# Patient Record
Sex: MALE | Race: WHITE
[De-identification: names, ages, dates, MRNs, and addresses within clinical notes are randomized per-mention and may not be internally consistent; named-entity substitution may affect disease eponyms.]

---

## 2018-02-13 ENCOUNTER — HOSPITAL ENCOUNTER (INPATIENT)
Dept: HOSPITAL 77 - KA.ED | Age: 68
LOS: 2 days | Discharge: HOME | DRG: 193 | End: 2018-02-15
Attending: NURSE PRACTITIONER | Admitting: PHYSICIAN ASSISTANT
Payer: MEDICARE

## 2018-02-13 DIAGNOSIS — N28.9: ICD-10-CM

## 2018-02-13 DIAGNOSIS — J09.X2: Primary | ICD-10-CM

## 2018-02-13 DIAGNOSIS — I25.10: ICD-10-CM

## 2018-02-13 DIAGNOSIS — Z95.1: ICD-10-CM

## 2018-02-13 DIAGNOSIS — I21.4: ICD-10-CM

## 2018-02-13 DIAGNOSIS — E78.2: ICD-10-CM

## 2018-02-13 DIAGNOSIS — J44.1: ICD-10-CM

## 2018-02-13 DIAGNOSIS — R09.02: ICD-10-CM

## 2018-02-13 DIAGNOSIS — E11.9: ICD-10-CM

## 2018-02-13 DIAGNOSIS — Z79.899: ICD-10-CM

## 2018-02-13 DIAGNOSIS — Z87.891: ICD-10-CM

## 2018-02-13 DIAGNOSIS — N17.9: ICD-10-CM

## 2018-02-13 DIAGNOSIS — E86.0: ICD-10-CM

## 2018-02-13 LAB
CHLORIDE SERPL-SCNC: 104 MMOL/L (ref 98–115)
SODIUM SERPL-SCNC: 143 MMOL/L (ref 136–145)

## 2018-02-13 PROCEDURE — 94640 AIRWAY INHALATION TREATMENT: CPT

## 2018-02-13 PROCEDURE — 99285 EMERGENCY DEPT VISIT HI MDM: CPT

## 2018-02-13 PROCEDURE — 87804 INFLUENZA ASSAY W/OPTIC: CPT

## 2018-02-13 PROCEDURE — 83880 ASSAY OF NATRIURETIC PEPTIDE: CPT

## 2018-02-13 PROCEDURE — 87040 BLOOD CULTURE FOR BACTERIA: CPT

## 2018-02-13 PROCEDURE — 99284 EMERGENCY DEPT VISIT MOD MDM: CPT

## 2018-02-13 PROCEDURE — 36415 COLL VENOUS BLD VENIPUNCTURE: CPT

## 2018-02-13 PROCEDURE — 80053 COMPREHEN METABOLIC PANEL: CPT

## 2018-02-13 PROCEDURE — 93005 ELECTROCARDIOGRAM TRACING: CPT

## 2018-02-13 PROCEDURE — 82553 CREATINE MB FRACTION: CPT

## 2018-02-13 PROCEDURE — 85025 COMPLETE CBC W/AUTO DIFF WBC: CPT

## 2018-02-13 PROCEDURE — 84484 ASSAY OF TROPONIN QUANT: CPT

## 2018-02-13 PROCEDURE — 71046 X-RAY EXAM CHEST 2 VIEWS: CPT

## 2018-02-13 PROCEDURE — 82550 ASSAY OF CK (CPK): CPT

## 2018-02-13 RX ADMIN — ASPIRIN SCH MG: 325 TABLET, DELAYED RELEASE ORAL at 19:11

## 2018-02-13 RX ADMIN — FLUTICASONE PROPIONATE AND SALMETEROL SCH INHALATION: 50; 250 POWDER RESPIRATORY (INHALATION) at 21:40

## 2018-02-13 NOTE — EDM.PDOC
ED HPI GENERAL MEDICAL PROBLEM





- General


Chief Complaint: Respiratory Problem


Stated Complaint: CHEST PAIN,SOB


Time Seen by Provider: 02/13/18 16:45


Source of Information: Reports: Patient, Family


History Limitations: Reports: No Limitations





- History of Present Illness


INITIAL COMMENTS - FREE TEXT/NARRATIVE: 





68 YO WM presents to ER complaining of 3 day history of shortness of breath 

with productive cough and left sided chest pain. Pt reports history of CAD with 

CABG x 3 vessels 2010. Pt also with history of COPD and remote tobacco use (

quit in 2010). Pt states he has been using his advair BID but hasn't used his 

rescue inhaler. Pt states he feels better on oxygen. 


Onset Date: 02/11/18


Duration: Day(s): (3), Waxing/Waning


Location: Reports: Chest


Quality: Reports: Pressure


Severity: Mild


Associated Symptoms: Reports: Chest Pain, cough w sputum, Fever/Chills, Loss of 

Appetite, Shortness of Breath.  Denies: Nausea/Vomiting


Treatments PTA: Reports: NSAIDS





- Related Data


 Allergies











Allergy/AdvReac Type Severity Reaction Status Date / Time


 


No Known Drug Allergies Allergy  Other Verified 02/13/18 16:51











Home Meds: 


 Home Meds





Albuterol [Proventil HFA] 1 puff INH Q4H PRN 02/13/18 [History]


Aspirin [Adult Low Dose Aspirin EC] 81 mg PO BEDTIME 02/13/18 [History]


Calcium Carbonate/Vitamin D3 [Calcium 500 + Vit D Caplet] 1 tab PO BID 02/13/18 

[History]


Carvedilol [Carvedilol] 25 mg PO BIDMEALS 02/13/18 [History]


Cholecalciferol (Vitamin D3) [Vitamin D3] 2,000 unit PO DAILY 02/13/18 [History]


Clopidogrel [Plavix] 75 mg PO BEDTIME 02/13/18 [History]


Fenofibrate [Fenofibrate] 160 mg PO BEDTIME 02/13/18 [History]


Fluticasone/Salmeterol [Advair 250-50 Diskus] 1 puff INH BID 02/13/18 [History]


Furosemide 20 mg PO 1800 02/13/18 [History]


Furosemide 40 mg PO DAILY 02/13/18 [History]


Losartan [Cozaar] 100 mg PO DAILY 02/13/18 [History]


Simvastatin [Zocor] 20 mg PO BEDTIME 02/13/18 [History]


metFORMIN HCl [Metformin HCl ER] 1,000 mg PO BID 02/13/18 [History]











Past Medical History


HEENT History: Reports: Impaired Vision


Cardiovascular History: Reports: MI


Respiratory History: Reports: COPD


Genitourinary History: Reports: None


Endocrine/Metabolic History: Reports: Diabetes, Type II





- Past Surgical History


Head Surgeries/Procedures: Reports: None


HEENT Surgical History: Reports: Cataract Surgery


Cardiovascular Surgical History: Reports: Coronary Artery Bypass


Other Cardiovascular Surgeries/Procedures: 3 vessel bypass 2010


Respiratory Surgical History: Reports: None





Social & Family History





- Family History


Family Medical History: Noncontributory





- Tobacco Use


Smoking Status *Q: Former Smoker


Years of Tobacco use: 40


Used Tobacco, but Quit: Yes


Month Tobacco Last Used: 10


Second Hand Smoke Exposure: No





- Caffeine Use


Caffeine Use: Reports: Coffee





- Recreational Drug Use


Recreational Drug Use: No





ED ROS GENERAL





- Review of Systems


Review Of Systems: See Below


Constitutional: Reports: Fever, Chills


HEENT: Reports: No Symptoms


Respiratory: Reports: Shortness of Breath, Wheezing, Cough, Sputum


Cardiovascular: Reports: Chest Pain


Endocrine: Reports: No Symptoms


GI/Abdominal: Reports: No Symptoms


: Reports: No Symptoms


Musculoskeletal: Reports: No Symptoms


Skin: Reports: No Symptoms


Neurological: Reports: No Symptoms


Psychiatric: Reports: No Symptoms


Hematologic/Lymphatic: Reports: No Symptoms


Immunologic: Reports: No Symptoms





ED EXAM, GENERAL





- Physical Exam


Exam: See Below


Exam Limited By: No Limitations


General Appearance: Alert, WD/WN, No Apparent Distress


Nose: Normal Inspection, Normal Mucosa, No Blood


Throat/Mouth: Normal Inspection, Normal Lips, Normal Teeth, Normal Gums, Normal 

Oropharynx, Normal Voice, No Airway Compromise


Head: Atraumatic, Normocephalic


Neck: Normal Inspection, Supple, Non-Tender, Full Range of Motion


Respiratory/Chest: No Respiratory Distress, No Accessory Muscle Use, Chest Non-

Tender, Decreased Breath Sounds, Wheezing


Cardiovascular: Normal Peripheral Pulses, Regular Rate, Rhythm, No Edema, No 

Gallop, No JVD, No Murmur, No Rub, Tachycardia


GI/Abdominal: Normal Bowel Sounds, Soft, Non-Tender, No Organomegaly, No 

Distention, No Abnormal Bruit, No Mass


Back Exam: Normal Inspection, Full Range of Motion, NT


Extremities: Normal Inspection, Normal Range of Motion, Non-Tender, Normal 

Capillary Refill, No Pedal Edema


Neurological: Alert, Oriented, CN II-XII Intact, Normal Cognition, Normal Gait, 

Normal Reflexes, No Motor/Sensory Deficits


Psychiatric: Normal Affect, Normal Mood


Skin Exam: Warm, Dry, Intact, Normal Color, No Rash


Lymphatic: No Adenopathy





EKG INTERPRETATION


EKG Date: 02/13/18


Time: 16:41


Rhythm: NSR


Rate (Beats/Min): 119


Axis: Normal


P-Wave: Present


QRS: Normal


ST-T: Normal


QT: Normal


Comparison: NA - No Prior EKG





Course





- Vital Signs


Last Recorded V/S: 


 Last Vital Signs











Temp  38.2 C H  02/13/18 17:17


 


Pulse  117 H  02/13/18 17:11


 


Resp  24 H  02/13/18 16:37


 


BP  149/85 H  02/13/18 16:37


 


Pulse Ox  97   02/13/18 17:11














- Orders/Labs/Meds


Orders: 


 Active Orders 24 hr











 Category Date Time Status


 


 EKG Documentation Completion [RC] ASDIRECTED Care  02/13/18 16:32 Active


 


 RT Aerosol Therapy [RC] ASDIRECTED Care  02/13/18 17:11 Active


 


 Chest 2V [CR] Stat Exams  02/13/18 16:32 Taken


 


 CULTURE BLOOD [BC] Stat Lab  02/13/18 17:13 Ordered


 


 CULTURE BLOOD [BC] Stat Lab  02/13/18 17:13 Ordered


 


 INFLUENZA A+B AG SCREEN [RM] Stat Lab  02/13/18 16:35 Received


 


 Blood Culture x2 Reflex Set [OM.PC] Stat Oth  02/13/18 17:12 Ordered


 


 EKG 12 Lead [EK] Routine Ther  02/13/18 16:32 Ordered











Labs: 


 Laboratory Tests











  02/13/18 02/13/18 Range/Units





  16:35 16:35 


 


WBC  6.6   (5.0-10.0)  10^3/uL


 


RBC  3.86 L   (4.50-6.00)  10^6/uL


 


Hgb  11.9 L   (13.0-17.0)  g/dL


 


Hct  35.4 L   (40.0-52.0)  %


 


MCV  91.6   (82.0-92.0)  fL


 


MCH  30.8   (27.0-31.0)  pg


 


MCHC  33.6   (32.0-36.0)  g/dL


 


RDW  13.5   (11.5-14.5)  %


 


Plt Count  207   (150-300)  10^3/uL


 


MPV  6.8 L   (7.4-10.4)  fL


 


Neut % (Auto)  80.1 H   (50.0-70.0)  %


 


Lymph % (Auto)  7.2 L   (20.0-40.0)  %


 


Mono % (Auto)  11.2 H   (2.0-8.0)  %


 


Eos % (Auto)  0.4 L   (1.0-3.0)  %


 


Baso % (Auto)  1.1 H   (0.0-1.0)  %


 


Neut # (Auto)  5.3   (2.5-7.0)  10^3/uL


 


Lymph # (Auto)  0.5 L   (1.0-4.0)  10^3/uL


 


Mono # (Auto)  0.7   (0.1-0.8)  10^3/uL


 


Eos # (Auto)  0.0 L   (0.1-0.3)  10^3/uL


 


Baso # (Auto)  0.1   (0.0-0.1)  10^3/uL


 


Sodium   143  (136-145)  mmol/L


 


Potassium   4.7  (3.3-5.3)  mmol/L


 


Chloride   104  ()  mmol/L


 


Carbon Dioxide   25.3  (21.0-32.0)  mmol/L


 


BUN   27 H  (6-25)  mg/dL


 


Creatinine   1.67 H  (0.51-1.17)  mg/dL


 


Est Cr Clr Drug Dosing   42.10  mL/min


 


Estimated GFR (MDRD)   41  mL/min


 


Glucose   203 H  ()  mg/dL


 


Calcium   9.1  (8.7-10.3)  mg/dL


 


Total Bilirubin   1.2 H  (0.2-1.0)  mg/dL


 


AST   39 H  (15-37)  U/L


 


ALT   53  (12-78)  U/L


 


Alkaline Phosphatase   58  ()  IU/L


 


Creatine Kinase   97  ()  U/L


 


CK-MB (CK-2)   < 0.50  (0.00-4.30)  ng/mL


 


Troponin I   0.10 H*  (0.00-0.070)  ng/mL


 


B-Natriuretic Peptide   349 H  (0-100)  pg/mL


 


Total Protein   8.2  (6.4-8.2)  g/dL


 


Albumin   4.17  (3.00-4.80)  g/dL











Meds: 


Medications














Discontinued Medications














Generic Name Dose Route Start Last Admin





  Trade Name Jewell  PRN Reason Stop Dose Admin


 


Acetaminophen  1,000 mg  02/13/18 17:13  02/13/18 17:17





  Tylenol Extra Strength  PO  02/13/18 17:14  1,000 mg





  ONETIME ONE   Administration


 


Albuterol/Ipratropium  3 ml  02/13/18 17:10  02/13/18 17:18





  Duoneb 3.0-0.5 Mg/3 Ml  NEB  02/13/18 17:11  3 ml





  ONETIME ONE   Administration














- Radiology Interpretation


Free Text/Narrative:: 





CXR- NAD





Departure





- Departure


Time of Disposition: 18:06


Disposition: Admitted As Inpatient 67


Condition: Fair


Clinical Impression: 


 Influenza A, Hypoxia, COPD exacerbation, Elevated troponin I level, Renal 

insufficiency, mild








- Discharge Information


Referrals: 


Gail Trinidad, NP [Primary Care Provider] - 


Forms:  ED Department Discharge





- My Orders


Last 24 Hours: 


My Active Orders





02/13/18 16:32


EKG Documentation Completion [RC] ASDIRECTED 


Chest 2V [CR] Stat 


EKG 12 Lead [EK] Routine 





02/13/18 16:35


INFLUENZA A+B AG SCREEN [RM] Stat 





02/13/18 17:11


RT Aerosol Therapy [RC] ASDIRECTED 





02/13/18 17:12


Blood Culture x2 Reflex Set [OM.PC] Stat 





02/13/18 17:13


CULTURE BLOOD [BC] Stat 


CULTURE BLOOD [BC] Stat 














- Assessment/Plan


Last 24 Hours: 


My Active Orders





02/13/18 16:32


EKG Documentation Completion [RC] ASDIRECTED 


Chest 2V [CR] Stat 


EKG 12 Lead [EK] Routine 





02/13/18 16:35


INFLUENZA A+B AG SCREEN [RM] Stat 





02/13/18 17:11


RT Aerosol Therapy [RC] ASDIRECTED 





02/13/18 17:12


Blood Culture x2 Reflex Set [OM.PC] Stat 





02/13/18 17:13


CULTURE BLOOD [BC] Stat 


CULTURE BLOOD [BC] Stat 











Assessment:: 





1. COPD exacerbation


2. Influenza A 


3. elevated trop I


4. Hypoxia


5. Renal insufficiency





Plan: 





1. Admit for hypoxia- Faizan Mariusz


2. oxygen


3. tamiflu


4. supportive care


5. repeat trop I/labs in am


6. duoneb tx Q4

## 2018-02-14 LAB
CHLORIDE SERPL-SCNC: 104 MMOL/L (ref 98–115)
SODIUM SERPL-SCNC: 141 MMOL/L (ref 136–145)

## 2018-02-14 RX ADMIN — Medication PRN LOZ: at 20:37

## 2018-02-14 RX ADMIN — FLUTICASONE PROPIONATE AND SALMETEROL SCH INHALATION: 50; 250 POWDER RESPIRATORY (INHALATION) at 07:25

## 2018-02-14 RX ADMIN — FLUTICASONE PROPIONATE AND SALMETEROL SCH INHALATION: 50; 250 POWDER RESPIRATORY (INHALATION) at 20:29

## 2018-02-14 RX ADMIN — Medication SCH TAB: at 17:33

## 2018-02-14 RX ADMIN — ASPIRIN SCH MG: 325 TABLET, DELAYED RELEASE ORAL at 08:02

## 2018-02-14 RX ADMIN — Medication PRN LOZ: at 18:17

## 2018-02-14 RX ADMIN — VITAMIN D, TAB 1000IU (100/BT) SCH UNITS: 25 TAB at 08:02

## 2018-02-14 RX ADMIN — Medication SCH TAB: at 07:32

## 2018-02-14 NOTE — PCM.HP
H&P History of Present Illness





- General


Date of Service: 02/14/18


Admit Problem/Dx: 


 Admission Diagnosis/Problem





Admission Diagnosis/Problem      Hypoxia








Source of Information: Patient, Old Records, RN


History Limitations: Reports: No Limitations





- History of Present Illness


Initial Comments - Free Text/Narative: 





This is a 67 year old male who presented to the ED with complaints of 3 days of 

shortness of breath with a productive cough and left-sided chest pain. The 

patient was worked up in the ED and found to be positive for influenza A, COPD 

exacerbation, and rule out MI.  The patient's past medical history is 

significant for COPD for which he uses Advair. He also has a history of a CABG 

x 3 in 2010. He quit smoking in 2010. He lives at home with his wife. He did 

not receive an influenza vaccine this year, but has received a pneumonia 

vaccine. 





- Related Data


Allergies/Adverse Reactions: 


 Allergies











Allergy/AdvReac Type Severity Reaction Status Date / Time


 


No Known Drug Allergies Allergy  Other Verified 02/13/18 16:51











Home Medications: 


 Home Meds





Albuterol [Proventil HFA] 1 puff INH Q4H PRN 02/13/18 [History]


Aspirin [Adult Low Dose Aspirin EC] 81 mg PO BEDTIME 02/13/18 [History]


Calcium Carbonate/Vitamin D3 [Calcium 500 + Vit D Caplet] 1 tab PO BID 02/13/18 

[History]


Carvedilol [Carvedilol] 25 mg PO BIDMEALS 02/13/18 [History]


Cholecalciferol (Vitamin D3) [Vitamin D3] 2,000 unit PO DAILY 02/13/18 [History]


Clopidogrel [Plavix] 75 mg PO BEDTIME 02/13/18 [History]


Fenofibrate [Fenofibrate] 160 mg PO BEDTIME 02/13/18 [History]


Fluticasone/Salmeterol [Advair 250-50 Diskus] 1 puff INH BID 02/13/18 [History]


Furosemide 20 mg PO 1800 02/13/18 [History]


Furosemide 40 mg PO DAILY 02/13/18 [History]


Losartan [Cozaar] 100 mg PO DAILY 02/13/18 [History]


Simvastatin [Zocor] 20 mg PO BEDTIME 02/13/18 [History]


metFORMIN HCl [Metformin HCl ER] 1,000 mg PO BID 02/13/18 [History]











Past Medical History


HEENT History: Reports: Impaired Vision


Cardiovascular History: Reports: MI


Respiratory History: Reports: COPD


Genitourinary History: Reports: None


Endocrine/Metabolic History: Reports: Diabetes, Type II





- Infectious Disease History


Infectious Disease History: Reports: Influenza





- Past Surgical History


Head Surgeries/Procedures: Reports: None


HEENT Surgical History: Reports: Cataract Surgery


Cardiovascular Surgical History: Reports: Coronary Artery Bypass


Other Cardiovascular Surgeries/Procedures: 3 vessel bypass 2010


Respiratory Surgical History: Reports: None





Social & Family History





- Family History


Family Medical History: Noncontributory





- Tobacco Use


Smoking Status *Q: Former Smoker


Years of Tobacco use: 40


Used Tobacco, but Quit: Yes


Month Tobacco Last Used: 10


Second Hand Smoke Exposure: No





- Caffeine Use


Caffeine Use: Reports: Soda





- Recreational Drug Use


Recreational Drug Use: No





H&P Review of Systems





- Review of Systems:


Review Of Systems: See Below


General: Reports: Chills, Weakness, Fatigue, Night Sweats, Decreased Appetite.  

Denies: Fever


HEENT: Reports: Ear Pain (left ear), Glasses, Sinus Congestion.  Denies: 

Headaches, Sore Throat


Pulmonary: Reports: Cough, Sputum (dark brown).  Denies: Shortness of Breath, 

Wheezing


Cardiovascular: Denies: Chest Pain, Edema


Gastrointestinal: Denies: Abdominal Pain, Constipation, Diarrhea, Nausea


Genitourinary: Reports: No Symptoms


Neurological: Denies: Headache





Exam





- Exam


Exam: See Below





- Vital Signs


Vital Signs: 


 Last Vital Signs











Temp  98 F   02/14/18 06:29


 


Pulse  77   02/14/18 07:35


 


Resp  18   02/14/18 06:29


 


BP  110/67   02/14/18 08:01


 


Pulse Ox  97   02/14/18 07:25











Weight: 166 lb 6.4 oz





- Exam


Quality Assessment: Supplemental Oxygen (2 liters per nasal cannula saturation 

of 97%, does not typically wear oxygen), DVT Prophylaxis (Score of 2, will 

apply Silver stockings. Patient up and moving. )


General: Alert, Oriented, Cooperative, Other (No distress)


HEENT: Conjunctiva Clear, Hearing Intact, Mucosa Moist & Pink, Posterior 

Pharynx Clear, TMs Clear, Glasses


Neck: Supple, Trachea Midline.  No: Lymphadenopathy


Lungs: Normal Respiratory Effort, Decreased Breath Sounds (throughout).  No: 

Crackles, Rales, Wheezing


Cardiovascular: Regular Rate, Regular Rhythm, Normal S1, Normal S2


GI/Abdominal Exam: Normal Bowel Sounds, Soft, Non-Tender


Extremities: No Pedal Edema


Skin: Warm, Dry


Neurological: Normal Speech


Neuro Extensive - Mental Status: Alert, Oriented x3, Normal Mood/Affect


Psychiatric: Alert, Normal Affect, Normal Mood





- Patient Data


Lab Results Last 24 hrs: 


 Laboratory Results - last 24 hr











  02/14/18 02/14/18 02/14/18 Range/Units





  07:20 07:20 07:31 


 


WBC  4.5 L    (5.0-10.0)  10^3/uL


 


RBC  3.29 L    (4.50-6.00)  10^6/uL


 


Hgb  10.3 L D    (13.0-17.0)  g/dL


 


Hct  30.5 L    (40.0-52.0)  %


 


MCV  92.7 H    (82.0-92.0)  fL


 


MCH  31.2 H    (27.0-31.0)  pg


 


MCHC  33.6    (32.0-36.0)  g/dL


 


RDW  13.4    (11.5-14.5)  %


 


Plt Count  189    (150-300)  10^3/uL


 


MPV  6.7 L    (7.4-10.4)  fL


 


Neut % (Auto)  84.9 H    (50.0-70.0)  %


 


Lymph % (Auto)  7.4 L    (20.0-40.0)  %


 


Mono % (Auto)  7.2    (2.0-8.0)  %


 


Eos % (Auto)  0.2 L    (1.0-3.0)  %


 


Baso % (Auto)  0.3    (0.0-1.0)  %


 


Neut # (Auto)  3.9    (2.5-7.0)  10^3/uL


 


Lymph # (Auto)  0.3 L    (1.0-4.0)  10^3/uL


 


Mono # (Auto)  0.3    (0.1-0.8)  10^3/uL


 


Eos # (Auto)  0.0 L    (0.1-0.3)  10^3/uL


 


Baso # (Auto)  0.0    (0.0-0.1)  10^3/uL


 


Sodium   141   (136-145)  mmol/L


 


Potassium   4.5   (3.3-5.3)  mmol/L


 


Chloride   104   ()  mmol/L


 


Carbon Dioxide   25.8   (21.0-32.0)  mmol/L


 


BUN   33 H   (6-25)  mg/dL


 


Creatinine   1.40 H   (0.51-1.17)  mg/dL


 


Est Cr Clr Drug Dosing   50.21   mL/min


 


Estimated GFR (MDRD)   51   mL/min


 


Glucose   238 H   ()  mg/dL


 


POC Glucose    215 H  ()  mg/dl


 


Calcium   8.8   (8.7-10.3)  mg/dL


 


Troponin I   0.06   (0.00-0.070)  ng/mL











Result Diagrams: 


 02/14/18 07:20





 02/14/18 07:20





EKG INTERPRETATION


EKG Date: 02/13/18


Time: 16:41


Rhythm: Other (Sinus tachycardia with PACs)


Rate (Beats/Min): 119


Axis: Normal


P-Wave: Present


QRS: Normal


ST-T: Other (T wave abnormality in inferior leads)


QT: Normal


Comparison: Change From Previous EKG (new PACs versus old PVCs)





*Q Meaningful Use (ADM)





- VTE *Q


VTE Criteria *Q: 








- Stroke *Q


Stroke Criteria *Q: 








- AMI *Q


AMI Criteria *Q: 





Problem List Initiated/Reviewed/Updated: Yes


Orders Last 24hrs: 


 Active Orders 24 hr











 Category Date Time Status


 


 Patient Status [ADT] Routine ADT  02/13/18 17:59 Ordered


 


 Blood Glucose Check, Bedside [RC] TIDMEALS Care  02/13/18 17:58 Active


 


 Oxygen Therapy [RC] 0100,0900,1700 Care  02/13/18 17:59 Active


 


 Peripheral IV Care [RC] .AS DIRECTED Care  02/13/18 18:02 Active


 


 Pulse Oximetry [RC] .PRN Care  02/13/18 18:00 Active


 


 RT Aerosol Therapy [RC] .PRN Care  02/13/18 18:02 Active


 


 Up ad Vivian [RC] ASDIRECTED Care  02/14/18 09:17 Active


 


 Vital Signs [RC] 0700,1500,2300 Care  02/14/18 09:17 Active


 


 [employer] Diet [DIET] Diet  02/13/18 Dinner Active


 


 BMP [BASIC METABOLIC PANEL,BMP] [CHEM] AM Lab  02/15/18 05:11 Ordered


 


 CBC WITH AUTO DIFF [HEME] AM Lab  02/15/18 05:11 Ordered


 


 CULTURE SPUTUM + SMEAR [RM] Stat Lab  02/13/18 20:05 Ordered


 


 Acetaminophen [Tylenol] Med  02/13/18 17:58 Active





 650 mg PO Q4H PRN   


 


 Albuterol/Ipratropium [DuoNeb 3.0-0.5 MG/3 ML] Med  02/14/18 11:00 Active





 3 ml NEB Q6HRRT   


 


 Aspirin [Halfprin] Med  02/14/18 21:00 Active





 81 mg PO BEDTIME   


 


 Atropine [Atropine 0.1 MG/ML] Med  02/13/18 20:19 Active





 0 mg IVPUSH ASDIRECTED PRN   


 


 Calcium Citrate/Vitamin D3 [Calcium Citrate + D] Med  02/14/18 08:00 Active





 2 tab PO BIDMEALS   


 


 Carvedilol [Coreg] Med  02/14/18 08:00 Active





 25 mg PO BIDMEALS   


 


 Cholecalciferol (Vitamin D3) [Vitamin D3] Med  02/14/18 09:00 Active





 2,000 units PO DAILY   


 


 Clopidogrel [Plavix] Med  02/13/18 21:00 Active





 75 mg PO BEDTIME   


 


 EPINEPHrine [EPINEPHrine 1:10,000] Med  02/13/18 20:19 Active





 1 mg IVPUSH ASDIRECTED PRN   


 


 Fluticasone/Salmeterol [Advair Diskus 250-50] Med  02/13/18 21:00 Active





 0 puff INH BIDRT   


 


 Furosemide [Lasix] Med  02/14/18 18:00 Active





 20 mg PO 1800   


 


 Furosemide [Lasix] Med  02/14/18 09:00 Active





 40 mg PO DAILY   


 


 Insulin Aspart [NovoLOG] Med  02/14/18 08:00 Active





 See Protocol  SUBCUT TIDMEALS   


 


 Lidocaine 2% [Xylocaine 2%] Med  02/13/18 20:19 Active





 0 mg IVPUSH ASDIRECTED PRN   


 


 Losartan [Cozaar] Med  02/14/18 09:00 Active





 100 mg PO DAILY   


 


 Nitroglycerin [Nitro-Bid 2%] Med  02/13/18 18:04 Active





 1 gm TOP Q6H PRN   


 


 Nitroglycerin [Nitrostat] Med  02/13/18 20:19 Active





 0.4 mg SL ASDIRECTED PRN   


 


 Oseltamivir [Tamiflu] Med  02/13/18 18:15 Active





 75 mg PO BID   


 


 Patient's Own Medication [Ptom] Med  02/14/18 09:25 Active





 1 each PO BEDTIME   


 


 Simvastatin [Zocor] Med  02/13/18 21:00 Active





 20 mg PO BEDTIME   


 


 Sodium Chloride 0.9% [Normal Saline] 750 ml Med  02/14/18 09:19 Active





 IV .BOLUS   


 


 Sodium Chloride 0.9% [Syrex Flush] Med  02/13/18 17:58 Active





 5 ml FLUSH Q8HR PRN   


 


 Isolation [COMM] Routine Oth  02/13/18 18:03 Ordered


 


 Peripheral IV Insertion Adult [OM.PC] Routine Oth  02/13/18 17:58 Ordered


 


 Resuscitation Status Routine Resus Stat  02/13/18 17:58 Ordered








 Medication Orders





Acetaminophen (Tylenol)  650 mg PO Q4H PRN


   PRN Reason: Pain (Mild 1-3)/fever


Albuterol/Ipratropium (Duoneb 3.0-0.5 Mg/3 Ml)  3 ml NEB Q6HRRT [facility]


Aspirin (Halfprin)  81 mg PO BEDTIME DALJIT


Atropine Sulfate (Atropine 0.1 Mg/Ml)  0 mg IVPUSH ASDIRECTED PRN


   PRN Reason: Heart


Calcium Citrate (Calcium Citrate + D)  2 tab PO BIDMEALS [facility]


   Last Admin: 02/14/18 07:32  Dose: 2 tab


Carvedilol (Coreg)  25 mg PO BIDMEALS [facility]


   Last Admin: 02/14/18 07:35  Dose: 25 mg


Cholecalciferol (Vitamin D3)  2,000 units PO DAILY [facility]


   Last Admin: 02/14/18 08:02  Dose: 2,000 units


Clopidogrel Bisulfate (Plavix)  75 mg PO BEDTIME [facility]


   Last Admin: 02/13/18 21:40  Dose: 75 mg


Epinephrine HCl (Epinephrine 1:10,000)  1 mg IVPUSH ASDIRECTED PRN


   PRN Reason: Heart


Furosemide (Lasix)  20 mg PO 1800 DAJLIT


Furosemide (Lasix)  40 mg PO DAILY [facility]


   Last Admin: 02/14/18 08:02  Dose: 40 mg


Sodium Chloride (Normal Saline)  750 mls @ 750 mls/hr IV .BOLUS ONE


   Stop: 02/14/18 10:18


Insulin Aspart (Novolog)  0 unit SUBCUT TIDMEALS [facility]


   PRN Reason: Protocol


   Last Admin: 02/14/18 07:55  Dose: 2 units


Lidocaine HCl (Xylocaine 2%)  0 mg IVPUSH ASDIRECTED PRN


   PRN Reason: Heart


Losartan Potassium (Cozaar)  100 mg PO DAILY [facility]


   Last Admin: 02/14/18 08:01  Dose: 100 mg


Nitroglycerin (Nitro-Bid 2%)  1 gm TOP Q6H PRN


   PRN Reason: Chest Pain


Nitroglycerin (Nitrostat)  0.4 mg SL ASDIRECTED PRN


   PRN Reason: Heart


Oseltamivir Phosphate (Tamiflu)  75 mg PO BID [facility]


   Last Admin: 02/14/18 08:02  Dose: 75 mg


   Admin: 02/13/18 20:00 Dose:  Not Given


   Admin: 02/13/18 19:11  Dose: 75 mg


Fenofibrate 160 Mg (Tab)  1 each PO BEDTIME [facility]


Fluticasone/Salmeterol (Advair Diskus 250-50)  0 puff INH BIDRT [facility]


   Last Admin: 02/14/18 07:25  Dose: 1 inhalation


   Admin: 02/13/18 21:40  Dose: 1 inhalation


Simvastatin (Zocor)  20 mg PO BEDTIME [facility]


   Last Admin: 02/13/18 21:40  Dose: 20 mg


Sodium Chloride (Syrex Flush)  5 ml FLUSH Q8HR PRN


   PRN Reason: Keep Vein Open


   Last Admin: 02/13/18 19:12  Dose: 5 ml








Assessment/Plan Comment:: 





HPI: This is a 67 year old male who presented to the ED with complaints of 3 

days of shortness of breath with a productive cough and left-sided chest pain. 

The patient was worked up in the ED and found to be positive for influenza A, 

COPD exacerbation, and rule out MI.  The patient's past medical history is 

significant for COPD for which he uses Advair. He also has a history of a CABG 

x 3 in 2010. He quit smoking in 2010. He lives at home with his wife. He did 

not receive an influenza vaccine this year, but has received a pneumonia 

vaccine. 





________________________________________________________________________________

____________________________________________________





Pertinent ED findings:


Influenza swab positive for A


Chest x-ray negative for acute process


Troponin elevated at 0.10


EKG sinus tachycardia with PACs, ST & T wave abnormality consider inferior 

ischemia





PRIMARY ASSESSMENT/PLAN: 





Influenza A. Tamiflu 75 mg po BID. WBC 4.5 with left shift. CBC in AM. Blood 

cultures and sputum culture pending. 





Rule out MI. Troponin normal at 0.06. Denies active chest pain. Discontinue 

telemetry. ED EKG-sinus tachycardia with PACs, ST & T wave abnormality consider 

inferior ischemia; changed from previous EKG (Aug. 2017) sinus rhythm with PVCs

, t wave abnormality consider inferolateral ischemia. Patient did receive 

heparin IV push and full strength aspirin. Patient on plavix, baby aspirin, BB, 

and ARB. 





COPD exacerbation, improving. One time dose of IV solumedrol 125 mg given. No 

active wheezing. Will continue to monitor. Change DuoNebs to QID. Continue 

Advair. Try to wean off oxygen when able. Will continue to monitor neutrophil 

count as may need to treat underlying bacterial infection if continues to 

elevate. 





Acute kidney injury related to dehydration. BUN 33, creatinine 1.40. Normal 

creatinine 1.02 with GFR of 73. Will give  mL IV bolus now. Continue to 

push oral fluids. Repeat BMP in AM. 





Anemia. Hgb down to 10.3. Aug. 2017 hgb was 13.5. No active signs of bleeding. 

Repeat CBC in AM. 





SECONDARY ASSESSMENT/PLAN: 





CAD, s/p 3 vessel CABG (2010). Continue plavix, baby aspirin, BB, and ARB. 





Chronic combined heart failure. ECHO (2015) EF 45%, mildly reduced systolic 

function, mild diastolic dysfunction, no LVH. BNP elevated at 349 on admission, 

however appears dry. Will give fluid bolus. Hold evening dose of lasix 20 mg 

tonight. Received 40 mg po this morning. 





Hypertension, stable. Continue current medication regimen. 





Mixed hyperlipidemia. Continue fenofibrate and simvastatin. LDL 59 (Aug. 2017). 





DVT prophylaxis. Score of 2. Silver stockings applied. 





Overall treatment plan: Continue with tamiflu and DuoNebs. Give 1 time fluid 

bolus for dehydration. Repeat labs in AM. Patient may be able to be discharged 

tomorrow pending kidney function and clinical picture.

## 2018-02-15 LAB
CHLORIDE SERPL-SCNC: 110 MMOL/L (ref 98–115)
SODIUM SERPL-SCNC: 148 MMOL/L (ref 136–145)

## 2018-02-15 RX ADMIN — FLUTICASONE PROPIONATE AND SALMETEROL SCH INHALATION: 50; 250 POWDER RESPIRATORY (INHALATION) at 08:06

## 2018-02-15 RX ADMIN — Medication SCH TAB: at 08:07

## 2018-02-15 RX ADMIN — VITAMIN D, TAB 1000IU (100/BT) SCH UNITS: 25 TAB at 08:10

## 2018-02-19 NOTE — DISCH
FINAL DIAGNOSES:  Influenza A, myocardial infarction non-STEMI, likely due to

rapid ventricular response; strain; chronic obstructive pulmonary disease

exacerbation, mild; acute kidney injury related to dehydration, resolved.

 

HISTORY:  A 67-year-old gentleman presents to the ED was three days of shortness

of breath, productive cough, left-sided chest pain.  He came to the ED, was

found to be influenza A with a mild COPD exacerbation, slightly elevated

troponin.  The patient did have COPD history and uses Advair.  He has had a CABG

x3 in 2010.  He quit smoking in 2010.  He was admitted for ongoing troponin

assessment and treatment for Tamiflu and medications for assistance to improve

his COPD exacerbation.

 

HOSPITAL COURSE:  Hospital course went as expected.  He was given IV Solu-Medrol

one time 125 mg.  Keep DuoNebs to q.i.d.  We continue with Advair.  He was given

some oxygen and we were able to wean that off.  He did have influenza A

positive, he was started on Tamiflu 75 mg p.o. b.i.d.  Blood cultures and sputum

cultures were pending on discharge.  He did have some mild dehydration.  Fluids

bolus were given.  We increased oral fluids that improved.  He had a low DVT

prophylaxis score.  Silver stockings were applied, however, I did give 4000 units

of heparin x1.  Troponins were monitored slightly elevated at 0.10, however,

subsequently went down to normal 0.06.  He no longer had any active chest pain.

 

LABORATORY DATA:  Hemoglobin 9.4, hematocrit 28.9.  Sodium slightly elevated at

148, potassium 3.9, BUN 35 with creatinine 1.23 responding after fluids.

Glucose 136.  Troponin normal.  Calcium 8.8.

 

PHYSICAL EXAMINATION:

VITAL SIGNS: On discharge, temperature 98.7, heart rate 87, blood pressure

118/81, O2 sats without oxygen 93%.

GENERAL: The patient did have some wheezing the morning of assessment.  I did

give him oral prednisone and he was discharged on oral tapering steroids.

Microbiology positive influenza A ,however, no growth on blood cultures after 3

days.  Negative for influenza B.

 

MEDICATIONS:  Tamiflu.  He will continue that until post five day therapy.

Prednisone taper.  He can continue on all other home medications.

The patient was ready for discharge by that afternoon.  Nurses reporting the

patient felt 100% better.  No wheezing.  No shortness of breath.  No chest pain.

Followup appointments were given to the patient.  He is to continue Tamiflu

reporting any chest pain or any fever or any shortness of breath.

 

DD:  02/17/2018 11:46:35

DT:  02/18/2018 02:35:48

Job #:  [id number]

## 2018-02-22 ENCOUNTER — HOSPITAL ENCOUNTER (INPATIENT)
Dept: HOSPITAL 77 - KA.MS | Age: 68
LOS: 2 days | Discharge: HOME | DRG: 191 | End: 2018-02-24
Attending: FAMILY MEDICINE | Admitting: NURSE PRACTITIONER
Payer: MEDICARE

## 2018-02-22 DIAGNOSIS — Z87.891: ICD-10-CM

## 2018-02-22 DIAGNOSIS — E88.81: ICD-10-CM

## 2018-02-22 DIAGNOSIS — Z79.899: ICD-10-CM

## 2018-02-22 DIAGNOSIS — E78.5: ICD-10-CM

## 2018-02-22 DIAGNOSIS — I50.42: ICD-10-CM

## 2018-02-22 DIAGNOSIS — D72.0: ICD-10-CM

## 2018-02-22 DIAGNOSIS — I25.10: ICD-10-CM

## 2018-02-22 DIAGNOSIS — E11.65: ICD-10-CM

## 2018-02-22 DIAGNOSIS — I10: ICD-10-CM

## 2018-02-22 DIAGNOSIS — J44.1: Primary | ICD-10-CM

## 2018-02-22 RX ADMIN — LEVOFLOXACIN SCH MLS/HR: 500 INJECTION, SOLUTION INTRAVENOUS at 18:29

## 2018-02-22 RX ADMIN — Medication SCH TAB: at 18:28

## 2018-02-22 RX ADMIN — LEVOFLOXACIN SCH MLS/HR: 5 INJECTION, SOLUTION INTRAVENOUS at 17:06

## 2018-02-23 RX ADMIN — LEVOFLOXACIN SCH MLS/HR: 5 INJECTION, SOLUTION INTRAVENOUS at 15:47

## 2018-02-23 RX ADMIN — Medication SCH TAB: at 18:11

## 2018-02-23 RX ADMIN — LEVOFLOXACIN SCH MLS/HR: 500 INJECTION, SOLUTION INTRAVENOUS at 16:54

## 2018-02-23 RX ADMIN — VITAMIN D, TAB 1000IU (100/BT) SCH UNITS: 25 TAB at 08:04

## 2018-02-23 RX ADMIN — Medication SCH TAB: at 08:04

## 2018-02-23 NOTE — PN
02/23/2018 PATIENT NAME:  ALBIN MAURER

 

CHIEF COMPLAINT:  Overall feels approximately 50% better, slept well, less mucus

production, less cough, less wheezing.

 

HISTORY:  67-year-old gentleman who I saw yesterday at the [facility] for he

had come in for increased shortness of breath and COPD.  He had been in the

hospital approximately two weeks ago due to influenza A.  He does have mild

COPD.  He had considerable amount of mucus production changing from his baseline

of white to green tenacious.  He had a white count yesterday at [facility]

of 18,000 with high neutrophilia, tachypneic and tachycardic, although his

oxygen levels were within acceptable parameters.  It was felt the patient could

benefit from hospitalization for IV antibiotics and close monitoring.

 

PHYSICAL EXAMINATION:  GENERAL:  The patient is full code.

VITAL SIGNS:  He is 157 pounds.  Heart rate is 92, O2 sats 91%, blood pressure

123/75.  The patient is alert and oriented.  He was sitting in a chair.  Tripod

stance initial presentation less dyspneic.

LUNGS:  He has good breath sounds.  Does have some inspiratory wheezing

posterior and slightly inspiratory wheeze right axillary area.

CV:  Regular rate and rhythm with no murmur.

GI:  Nontender.  Good bowel tones.  No pedal edema noted.  No use of accessory

muscle.  The patient able to talk full sentences.

 

DIAGNOSTIC DATA:  X-rays wet read yesterday.  I did not see any definitive

absolute consolidation.  The diaphragms were cut off.  I did not have radiology

repeat.  Official report still pending.

 

LABORATORY DATA:  Labs this morning, white count down to 9.0, hemoglobin 9.7,

hematocrit 29.1, platelets 423, neutrophilia 84%.  Glucose 176.  I am holding

his metformin.

 

IMPRESSION/PLAN:

1. COPD with acute exacerbation, likely bacterial component due to his

    pulmonary pathology, continue with Levaquin 750 mg daily.  Nontoxic in

    appearance.  Blood cultures drawn before antibiotics still pending.

    Respiratory culture received, pending.  Continue with DuoNeb.  Hold Advair.

    Oral prednisone reduced to 40 mg daily.  Encouraged coughing and deep

    breathing.  The patient responding to treatment significantly.

2. Steroid-induced hyperglycemia in the setting of T2DM.

    Holding metformin due to acute status.  Insulin sliding scale with NovoLog.

    Continue monitoring Accu-Cheks.



Secondary problems: 

1. Hypertension controlled on selective Coreg.

2. CAD, nonischemic picture, on DAPT.

3. HLD, statin therapy.

4. 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  No

    fluid overload.  We will have to review EMR.  Does not appear the patient

    is on ACE inhibitor.  This will have to be reviewed as outpatient.

 

OVERALL PLAN:  Disposition and discharge planning.  Continue with

inpatient stay.  He has been receiving benefit from IV respiratory

fluoroquinolone.  Ongoing monitoring oxygen status, blood culture surveillance.

The patient is clinically improving.  He could benefit from another stay of

inpatient possible discharge tomorrow on PO ABX.. Close followup as outpatient

regarding medication review--ACEI initiation?  We will have staff review 
pneumococcal status

likely could start back up on Advair tomorrow.  Reduce oral steroid today.

Continue DuoNeb.

 

DD:  02/23/2018 09:45:23

DT:  02/23/2018 10:29:16

Job #:  [id number]

MTDD

## 2018-02-24 LAB
CHLORIDE SERPL-SCNC: 102 MMOL/L (ref 98–115)
SODIUM SERPL-SCNC: 140 MMOL/L (ref 136–145)

## 2018-02-24 RX ADMIN — Medication SCH TAB: at 08:02

## 2018-02-24 RX ADMIN — VITAMIN D, TAB 1000IU (100/BT) SCH UNITS: 25 TAB at 08:05

## 2018-02-24 NOTE — PCM.DCSUM1
**Discharge Summary





- Discharge Data


Discharge Date: 02/24/18


Discharge Disposition: Home, Self-Care 01


Condition: Good





- Patient Instructions


Diet: Usual Diet as Tolerated


Activity: As Tolerated





- Discharge Plan


Prescriptions/Med Rec: 


Levofloxacin 500 mg PO DAILY 4 Days #4 tablet


Prednisone [IJD: predniSONE] 40 mg PO WITHBREAKFAST 2 Days #4 tab


Home Medications: 


 Home Meds





Albuterol [Proventil HFA] 1 puff INH Q4H PRN 02/13/18 [History]


Aspirin [Adult Low Dose Aspirin EC] 81 mg PO BEDTIME 02/13/18 [History]


Calcium Carbonate/Vitamin D3 [Calcium 500 + Vit D Caplet] 1 tab PO BID 02/13/18 

[History]


Carvedilol 25 mg PO BIDMEALS 02/13/18 [History]


Cholecalciferol (Vitamin D3) [Vitamin D3] 2,000 unit PO DAILY 02/13/18 [History]


Clopidogrel [Plavix] 75 mg PO BEDTIME 02/13/18 [History]


Fenofibrate 160 mg PO BEDTIME 02/13/18 [History]


Fluticasone/Salmeterol [Advair 250-50 Diskus] 1 puff INH BID 02/13/18 [History]


Furosemide 40 mg PO DAILY@0800 02/13/18 [History]


Losartan [Cozaar] 100 mg PO DAILY 02/13/18 [History]


Simvastatin [Zocor] 20 mg PO BEDTIME 02/13/18 [History]


metFORMIN HCl [Metformin HCl ER] 1,000 mg PO BIDMEALS 02/13/18 [History]


Magnesium Oxide 400 mg PO DAILY 02/22/18 [History]


Furosemide 20 mg PO DAILY@1700 02/23/18 [History]


Levofloxacin 500 mg PO DAILY 4 Days #4 tablet 02/24/18 [Rx]


Prednisone [IJD: predniSONE] 40 mg PO WITHBREAKFAST 2 Days #4 tab 02/24/18 [Rx]








Referrals: 


Faizan Vee NP [Primary Care Provider] -  (Call clinic to schedule 

appointment for next week.)





- Patient Data


Vitals - Most Recent: 


 Last Vital Signs











Temp  37.2 C   02/24/18 06:35


 


Pulse  81   02/24/18 08:04


 


Resp  20   02/24/18 06:35


 


BP  123/78   02/24/18 08:04


 


Pulse Ox  94 L  02/24/18 06:35











Weight - Most Recent: 71.305 kg


I&O - Last 24 hours: 


 Intake & Output











 02/23/18 02/24/18 02/24/18





 22:59 06:59 14:59


 


Intake Total 1176 250 


 


Balance 1176 250 











Lab Results - Last 24 hrs: 


 Laboratory Results - last 24 hr











  02/23/18 02/23/18 02/24/18 Range/Units





  11:29 17:28 06:44 


 


WBC     (5.0-10.0)  10^3/uL


 


RBC     (4.50-6.00)  10^6/uL


 


Hgb     (13.0-17.0)  g/dL


 


Hct     (40.0-52.0)  %


 


MCV     (82.0-92.0)  fL


 


MCH     (27.0-31.0)  pg


 


MCHC     (32.0-36.0)  g/dL


 


RDW     (11.5-14.5)  %


 


Plt Count     (150-300)  10^3/uL


 


MPV     (7.4-10.4)  fL


 


Neut % (Auto)     (50.0-70.0)  %


 


Lymph % (Auto)     (20.0-40.0)  %


 


Mono % (Auto)     (2.0-8.0)  %


 


Eos % (Auto)     (1.0-3.0)  %


 


Baso % (Auto)     (0.0-1.0)  %


 


Neut # (Auto)     (2.5-7.0)  10^3/uL


 


Lymph # (Auto)     (1.0-4.0)  10^3/uL


 


Mono # (Auto)     (0.1-0.8)  10^3/uL


 


Eos # (Auto)     (0.1-0.3)  10^3/uL


 


Baso # (Auto)     (0.0-0.1)  10^3/uL


 


ESR     (0-15)  mm/hr


 


Sodium     (136-145)  mmol/L


 


Potassium     (3.3-5.3)  mmol/L


 


Chloride     ()  mmol/L


 


Carbon Dioxide     (21.0-32.0)  mmol/L


 


BUN     (6-25)  mg/dL


 


Creatinine     (0.51-1.17)  mg/dL


 


Est Cr Clr Drug Dosing     mL/min


 


Estimated GFR (MDRD)     mL/min


 


Glucose     ()  mg/dL


 


POC Glucose  167 H  225 H  138 H  ()  mg/dl


 


Calcium     (8.7-10.3)  mg/dL














  02/24/18 02/24/18 02/24/18 Range/Units





  07:20 07:20 07:20 


 


WBC   9.0   (5.0-10.0)  10^3/uL


 


RBC   3.16 L   (4.50-6.00)  10^6/uL


 


Hgb   9.7 L   (13.0-17.0)  g/dL


 


Hct   28.6 L   (40.0-52.0)  %


 


MCV   90.5   (82.0-92.0)  fL


 


MCH   30.8   (27.0-31.0)  pg


 


MCHC   34.0   (32.0-36.0)  g/dL


 


RDW   13.6   (11.5-14.5)  %


 


Plt Count   392 H   (150-300)  10^3/uL


 


MPV   7.1 L   (7.4-10.4)  fL


 


Neut % (Auto)   79.9 H   (50.0-70.0)  %


 


Lymph % (Auto)   11.7 L   (20.0-40.0)  %


 


Mono % (Auto)   7.7   (2.0-8.0)  %


 


Eos % (Auto)   0.6 L   (1.0-3.0)  %


 


Baso % (Auto)   0.1   (0.0-1.0)  %


 


Neut # (Auto)   7.1 H   (2.5-7.0)  10^3/uL


 


Lymph # (Auto)   1.1   (1.0-4.0)  10^3/uL


 


Mono # (Auto)   0.7   (0.1-0.8)  10^3/uL


 


Eos # (Auto)   0.1   (0.1-0.3)  10^3/uL


 


Baso # (Auto)   0.0   (0.0-0.1)  10^3/uL


 


ESR  48 H    (0-15)  mm/hr


 


Sodium    140  (136-145)  mmol/L


 


Potassium    4.3  (3.3-5.3)  mmol/L


 


Chloride    102  ()  mmol/L


 


Carbon Dioxide    29.5  (21.0-32.0)  mmol/L


 


BUN    20  (6-25)  mg/dL


 


Creatinine    1.12  (0.51-1.17)  mg/dL


 


Est Cr Clr Drug Dosing    62.77  mL/min


 


Estimated GFR (MDRD)    > 60  mL/min


 


Glucose    141 H  ()  mg/dL


 


POC Glucose     ()  mg/dl


 


Calcium    9.5  (8.7-10.3)  mg/dL











YAMILET Results - Last 24 hrs: 


 Microbiology











 02/22/18 16:03  - Final





 Sputum - Expectorated 


 


 02/22/18 16:50 Aerobic Blood Culture - Preliminary





 Blood    NO GROWTH AFTER 1 DAY





 Anaerobic Blood Culture - Preliminary





    NO GROWTH AFTER 1 DAY











Med Orders - Current: 


 Current Medications





Albuterol/Ipratropium (Duoneb 3.0-0.5 Mg/3 Ml)  3 ml NEB Q4HWA [facility]


   Last Admin: 02/24/18 10:30 Dose:  3 ml


Aspirin (Halfprin)  81 mg PO BEDTIME [facility]


   Last Admin: 02/23/18 20:38 Dose:  81 mg


Calcium Citrate (Calcium Citrate + D)  2 tab PO BIDMEALS [facility]


   Last Admin: 02/24/18 08:02 Dose:  2 tab


Carvedilol (Coreg)  25 mg PO BID [facility]


   Last Admin: 02/24/18 08:04 Dose:  25 mg


Cholecalciferol (Vitamin D3)  2,000 units PO DAILY [facility]


   Last Admin: 02/24/18 08:05 Dose:  2,000 units


Clopidogrel Bisulfate (Plavix)  75 mg PO BEDTIME [facility]


   Last Admin: 02/23/18 20:38 Dose:  75 mg


Levofloxacin/Dextrose (Levaquin In D5w 250 Mg/50 Ml)  50 mls @ 50 mls/hr IV 

Q24H [facility]


   Last Admin: 02/23/18 15:47 Dose:  50 mls/hr


Levofloxacin/Dextrose (Levaquin In D5w 500 Mg/100 Ml)  100 mls @ 100 mls/hr IV 

Q24H [facility]


   Last Admin: 02/23/18 16:54 Dose:  100 mls/hr


Insulin Aspart (Novolog)  0 unit SUBCUT TIDMEALS [facility]


   PRN Reason: Protocol


   Last Admin: 02/24/18 08:06 Dose:  Not Given


Simvastatin (Zocor)  20 mg PO BEDTIME [facility]


   Last Admin: 02/23/18 20:38 Dose:  20 mg


Sodium Chloride (Syrex Flush)  5 ml FLUSH Q8HR PRN


   PRN Reason: Keep Vein Open


   Last Admin: 02/23/18 18:12 Dose:  5 ml





Discontinued Medications





Sodium Chloride (Normal Saline)  600 mls @ 999 mls/hr IV ONETIME ONE


   Stop: 02/22/18 16:51


   Last Admin: 02/22/18 16:59 Dose:  999 mls/hr


Sodium Chloride (Normal Saline)  1,000 mls @ 70 mls/hr IV ASDIRECTED [facility]


   Last Admin: 02/23/18 00:48 Dose:  70 mls/hr


Prednisone (Prednisone)  60 mg PO ONETIME ONE


   Stop: 02/22/18 16:16


   Last Admin: 02/22/18 16:56 Dose:  60 mg


Prednisone (Prednisone)  40 mg PO ONETIME ONE


   Stop: 02/23/18 12:01


   Last Admin: 02/23/18 11:45 Dose:  40 mg











*Q Meaningful Use (DIS)





- VTE *Q


VTE Criteria *Q: 








- Stroke *Q


Stroke Criteria *Q: 








- AMI *Q


AMI Criteria *Q:

## 2019-08-19 NOTE — EDM.PDOC
ED HPI GENERAL MEDICAL PROBLEM





- General


Chief Complaint: Gastrointestinal Problem


Stated Complaint: RECTAL PAIN


Time Seen by Provider: 08/19/19 08:00


Source of Information: Reports: Patient


History Limitations: Reports: No Limitations





- History of Present Illness


INITIAL COMMENTS - FREE TEXT/NARRATIVE: 





69 YO WM presents to ER complaining of rectal pain with sitting x 3 days. Pt 

reports history of thrombosed hemorrhoids in the past. Pt denies any rectal 

bleeding. Pt denies any OTC medication treatment. Pt came to ER with 

expectation of lancing his hemorrhoids for treatment since that is what was 

done for him in the past (2001). Pt denies abdominal pain, fever/chills, nausea/

vomiting, or constipation. Pt reports increased pain with defecation. Pt 

reports relief of pain from lying on his side or standing. 


Duration: Day(s): (3)


Quality: Reports: Ache


Severity: Moderate


Improves with: Reports: Other (lying and standing)


Worsens with: Reports: Other (sitting)


Associated Symptoms: Reports: No Other Symptoms


  ** Rectal


Pain Score (Numeric/FACES): 5





- Related Data


 Allergies











Allergy/AdvReac Type Severity Reaction Status Date / Time


 


No Known Drug Allergies Allergy  Other Verified 08/19/19 07:38











Home Meds: 


 Home Meds





Albuterol [Proventil HFA] 1 puff INH Q4H PRN 02/13/18 [History]


Aspirin [Adult Low Dose Aspirin EC] 81 mg PO BEDTIME 02/13/18 [History]


Carvedilol 25 mg PO BIDMEALS 02/13/18 [History]


Cholecalciferol (Vitamin D3) [Vitamin D3] 2,000 unit PO DAILY 02/13/18 [History]


Clopidogrel [Plavix] 75 mg PO BEDTIME 02/13/18 [History]


Furosemide 40 mg PO DAILY@0800 02/13/18 [History]


Losartan [Cozaar] 50 mg PO DAILY 02/13/18 [History]


Docusate Sodium [Colace] 100 mg PO BID PRN #30 cap 08/19/19 [Rx]


Fluticasone/Umeclidin/Vilanter [Trelegy Ellipta 100-62.5-25 MCG] 1 puff INH 

DAILY 08/19/19 [History]


Hydrocortisone [Hydrocortisone 2.5% Crm] 30 gm .XX TID PRN #1 tube 08/19/19 [Rx]


atorvaSTATin [Lipitor] 40 mg PO DAILY 08/19/19 [History]











Past Medical History


HEENT History: Reports: Impaired Vision


Cardiovascular History: Reports: Heart Failure, MI, Stents


Respiratory History: Reports: COPD


Gastrointestinal History: Reports: Hemorrhoids


Genitourinary History: Reports: None


Musculoskeletal History: Reports: None


Neurological History: Reports: None


Endocrine/Metabolic History: Reports: Diabetes, Type II


Hematologic History: Reports: Blood Transfusion(s)





- Infectious Disease History


Infectious Disease History: Reports: Chicken Pox, Influenza





- Past Surgical History


Head Surgeries/Procedures: Reports: None


HEENT Surgical History: Reports: Cataract Surgery


Cardiovascular Surgical History: Reports: Coronary Artery Bypass


Other Cardiovascular Surgeries/Procedures: 3 vessel bypass 2010


Respiratory Surgical History: Reports: None


GI Surgical History: Reports: Colonoscopy


Neurological Surgical History: Reports: C-Spine


Musculoskeletal Surgical History: Reports: None





Social & Family History





- Family History


Family Medical History: Noncontributory





- Tobacco Use


Smoking Status *Q: Former Smoker


Used Tobacco, but Quit: Yes


Month/Year Tobacco Last Used: 2004





- Caffeine Use


Caffeine Use: Reports: Coffee, Soda





- Alcohol Use


Days Per Week of Alcohol Use: 1


Number of Drinks Per Day: 3


Total Drinks Per Week: 3





- Recreational Drug Use


Recreational Drug Use: No





ED ROS GENERAL





- Review of Systems


Review Of Systems: See Below


Constitutional: Reports: No Symptoms


HEENT: Reports: No Symptoms


Respiratory: Reports: No Symptoms


Cardiovascular: Reports: No Symptoms


Endocrine: Reports: No Symptoms


GI/Abdominal: Reports: No Symptoms.  Denies: Black Stool, Bloody Stool, 

Constipation, Hematemesis, Hematochezia, Mucous in Stool


: Reports: No Symptoms


Musculoskeletal: Reports: No Symptoms


Skin: Reports: No Symptoms


Neurological: Reports: No Symptoms


Psychiatric: Reports: No Symptoms


Hematologic/Lymphatic: Reports: No Symptoms


Immunologic: Reports: No Symptoms





ED EXAM, GI/ABD





- Physical Exam


Exam: See Below


Exam Limited By: No Limitations


General Appearance: Alert, WD/WN, No Apparent Distress


Head: Atraumatic, Normocephalic


Neck: Normal Inspection


Respiratory/Chest: No Respiratory Distress, Lungs Clear, Normal Breath Sounds, 

No Accessory Muscle Use, Chest Non-Tender


Cardiovascular: Normal Peripheral Pulses, Regular Rate, Rhythm, No Edema, No 

Gallop, No JVD, No Murmur, No Rub


GI/Abdominal Exam: Normal Bowel Sounds, Soft, Non-Tender, No Organomegaly, No 

Distention, No Abnormal Bruit, No Mass, Pelvis Stable


Rectal (Males) Exam: Hemorrhoids (without thrombose or bleeding), Tenderness.  

No: Black Stool, Bloody Stool, Decreased Rectal Tone, Rectal Fissure


Back Exam: Normal Inspection, Full Range of Motion, NT


Extremities: Normal Inspection, Normal Range of Motion, Non-Tender, Normal 

Capillary Refill, No Pedal Edema


Neurological: Alert, Oriented, CN II-XII Intact, Normal Cognition, Normal Gait, 

Normal Reflexes, No Motor/Sensory Deficits


Psychiatric: Normal Affect, Normal Mood


Skin Exam: Warm, Dry, Intact, Normal Color, No Rash


Lymphatic: No Adenopathy





Course





- Vital Signs


Last Recorded V/S: 





 Last Vital Signs











Temp  35.7 C   08/19/19 07:33


 


Pulse  105 H  08/19/19 07:33


 


Resp  18   08/19/19 07:33


 


BP  140/77   08/19/19 07:33


 


Pulse Ox  91 L  08/19/19 07:33














Departure





- Departure


Time of Disposition: 08:50


Disposition: Home, Self-Care 01


Condition: Good


Clinical Impression: 


Hemorrhoids


Qualifiers:


 Hemorrhoid type: unspecified Qualified Code(s): K64.9 - Unspecified hemorrhoids








- Discharge Information


Prescriptions: 


Docusate Sodium [Colace] 100 mg PO BID PRN #30 cap


 PRN Reason: Constipation


Hydrocortisone [Hydrocortisone 2.5% Crm] 30 gm .XX TID PRN #1 tube


 PRN Reason: Pain


Instructions:  Hemorrhoids


Referrals: 


Gail Trinidad, NP [Primary Care Provider] - 


Additional Instructions: 


1. discharge home


2. no prolonged sitting


3. sitz baths


4. hydrocortisone suppositories


5. colace 100mg PO BID stool softener


6. follow up in clinic for further evaluation and treatment


7. return to ER for worsening symptoms


8. motrin 600mg/Tylenol 1g every 6 hours as needed for pain





- Assessment/Plan


Assessment:: 





1. uncomplicated hemorrhoids





Plan: 





1. discharge home


2. no prolonged sitting


3. sitz baths


4. hydrocortisone suppositories


5. colace 100mg PO BID stool softener


6. follow up in clinic for further evaluation and treatment


7. return to ER for worsening symptoms


8. motrin 600mg/Tylenol 1g every 6 hours as needed for pain

## 2019-11-27 NOTE — CR
______________________________________________________________________________   

  

[id number] RAD/RAD Chest PA And Lateral  

EXAM: FRONTAL AND LATERAL CHEST  

   

 INDICATION: Shortness of breath.  

   

 COMPARISON: February 13, 2018.  

   

 DISCUSSION: Development of mild to moderate left mid and lower lung infiltrates.  

 Unless clinically indicated sooner, 4-6 week follow-up exam suggested. The heart  

 is borderline enlarged with mild prominence of the central vasculature, but no  

 viktoria edema. Prior sternotomy. Hyperaeration consistent with chronic obstructive  

 pulmonary disease.  

   

 IMPRESSION:    

 1.  Pneumonia in the lingula and left lower lobe.  

   

 Electronically signed by Armando Quach MD on 11/27/2019 11:33 AM  

   

  

Armando Quach MD                 

 11/27/19 1136    

  

Thank you for allowing us to participate in the care of your patient.

## 2019-11-27 NOTE — EDM.PDOC
ED HPI GENERAL MEDICAL PROBLEM





- General


Chief Complaint: Respiratory Problem


Stated Complaint: SOB


Time Seen by Provider: 11/27/19 10:37


Source of Information: Reports: Patient


History Limitations: Reports: No Limitations





- History of Present Illness


INITIAL COMMENTS - FREE TEXT/NARRATIVE: 





Patient is a 69-year-old gentleman who presents to the emergency department via 

private vehicle this morning with a complaint of redness of breath.  Patient 

states that he was in his recliner watching TV at 9 a.m. and developed 

shortness of breath.  He also felt nauseous, however, did not vomit.  Became 

concerned and wife decided to have him present to the ER.  Patient does have a 

history of COPD, however, does not use oxygen at home.  Patient has history of 

CABG in 2010.  Uneventful since then.  Patient denies chest pain, abdominal pain

, fever, lower extremity edema, or recent change in medication.


Onset: Today


Duration: Hour(s):


Location: Reports: Chest


Severity: Mild


Improves with: Reports: None


Worsens with: Reports: None


Context: Reports: Other (While at rest)


Associated Symptoms: Reports: Nausea/Vomiting, Shortness of Breath.  Denies: 

Chest Pain, Cough, cough w sputum, Diaphoresis, Fever/Chills





- Related Data


 Allergies











Allergy/AdvReac Type Severity Reaction Status Date / Time


 


No Known Drug Allergies Allergy  Other Verified 11/27/19 10:14











Home Meds: 


 Home Meds





Albuterol [Proventil HFA] 1 puff INH Q4H PRN 02/13/18 [History]


Aspirin [Adult Low Dose Aspirin EC] 81 mg PO BEDTIME 02/13/18 [History]


Clopidogrel [Plavix] 75 mg PO BEDTIME 02/13/18 [History]


Furosemide 40 mg PO DAILY@0800 02/13/18 [History]


Losartan [Cozaar] 50 mg PO DAILY 02/13/18 [History]


carvediloL [Carvedilol] 25 mg PO BIDMEALS 02/13/18 [History]


Docusate Sodium [Colace] 100 mg PO BID PRN #30 cap 08/19/19 [Rx]


Fluticasone/Umeclidin/Vilanter [Trelegy Ellipta 100-62.5-25 MCG] 1 puff INH 

DAILY 08/19/19 [History]


atorvaSTATin [Lipitor] 40 mg PO DAILY 08/19/19 [History]











Past Medical History


HEENT History: Reports: Impaired Vision


Cardiovascular History: Reports: Heart Failure, MI, Stents


Respiratory History: Reports: COPD


Gastrointestinal History: Reports: Hemorrhoids


Genitourinary History: Reports: None


Musculoskeletal History: Reports: None


Neurological History: Reports: None


Endocrine/Metabolic History: Reports: Diabetes, Type II


Hematologic History: Reports: Blood Transfusion(s)





- Infectious Disease History


Infectious Disease History: Reports: Chicken Pox, Influenza





- Past Surgical History


Head Surgeries/Procedures: Reports: None


HEENT Surgical History: Reports: Cataract Surgery


Cardiovascular Surgical History: Reports: Coronary Artery Bypass


Other Cardiovascular Surgeries/Procedures: 3 vessel bypass 2010


Respiratory Surgical History: Reports: None


GI Surgical History: Reports: Colonoscopy


Neurological Surgical History: Reports: C-Spine


Musculoskeletal Surgical History: Reports: None





Social & Family History





- Family History


Family Medical History: Noncontributory





- Caffeine Use


Caffeine Use: Reports: Coffee, Soda





ED ROS GENERAL





- Review of Systems


Review Of Systems: Comprehensive ROS is negative, except as noted in HPI.


Constitutional: Reports: No Symptoms


HEENT: Reports: No Symptoms


Respiratory: Reports: Shortness of Breath


Cardiovascular: Reports: No Symptoms.  Denies: Chest Pain


Endocrine: Reports: No Symptoms


GI/Abdominal: Reports: Nausea.  Denies: Abdominal Pain, Vomiting


: Reports: No Symptoms


Musculoskeletal: Reports: No Symptoms


Skin: Reports: No Symptoms


Neurological: Reports: No Symptoms


Psychiatric: Reports: No Symptoms


Hematologic/Lymphatic: Reports: No Symptoms


Immunologic: Reports: No Symptoms





ED EXAM, GENERAL





- Physical Exam


Exam: See Below


Exam Limited By: No Limitations


General Appearance: Alert, WD/WN, No Apparent Distress


Eye Exam: Bilateral Eye: Normal Inspection


Nose: Normal Inspection, Normal Mucosa, No Blood


Throat/Mouth: Normal Inspection, Normal Oropharynx, No Airway Compromise


Head: Atraumatic, Normocephalic


Neck: Normal Inspection, Supple, Non-Tender


Respiratory/Chest: Chest Non-Tender, Decreased Breath Sounds (Throughout)


Cardiovascular: Tachycardia


GI/Abdominal: Normal Bowel Sounds, Soft, Non-Tender, No Organomegaly, No 

Distention, No Abnormal Bruit, No Mass


Back Exam: Normal Inspection.  No: CVA Tenderness (L), CVA Tenderness (R)


Extremities: Normal Inspection, No Pedal Edema


Neurological: Alert, Oriented, Normal Cognition


Psychiatric: Normal Affect, Normal Mood


Skin Exam: Warm, Dry, Intact, Normal Color, No Rash


Lymphatic: No Adenopathy





EKG INTERPRETATION


EKG Date: 11/27/19


Time: 10:50


Rhythm: Other (Sinus tachycardia)


Rate (Beats/Min): 109


Comparison: Change From Previous EKG


EKG Interpretation Comments: 





Q waves in septal leads, consider non-STEMI





Course





- Vital Signs


Last Recorded V/S: 


 Last Vital Signs











Temp  96.6 F   11/27/19 11:10


 


Pulse  108 H  11/27/19 11:10


 


Resp  18   11/27/19 11:10


 


BP  96/61   11/27/19 11:10


 


Pulse Ox  95   11/27/19 11:10














- Orders/Labs/Meds


Orders: 


 Active Orders 24 hr











 Category Date Time Status


 


 EKG Documentation Completion [RC] ASDIRECTED Care  11/27/19 10:39 Ordered


 


 RT Aerosol Therapy [RC] ASDIRECTED Care  11/27/19 11:23 Ordered


 


 CULTURE BLOOD [BC] Stat Lab  11/27/19 12:17 Ordered


 


 CULTURE BLOOD [BC] Stat Lab  11/27/19 12:17 Ordered


 


 LACTIC ACID [CHEM] Stat Lab  11/27/19 12:17 Ordered


 


 Azithromycin [Zithromax] 500 mg Med  11/27/19 11:50 Ordered





 Sodium Chloride 0.9% [Normal Saline] 250 ml   





 IV ONETIME   


 


 Heparin Sodium/0.45% NaCl [Heparin 25,000 Units in 1/2 Med  11/27/19 12:45 

Ordered





  ML]   





 25,000 units in 250 ml IV TITRATE   


 


 Blood Culture x2 Reflex Set [OM.PC] Stat Oth  11/27/19 12:17 Ordered








 Medication Orders





Azithromycin 500 mg/ Sodium (Chloride)  250 mls @ 250 mls/hr IV ONETIME ONE


   Stop: 11/27/19 12:49


Heparin Sodium/Sodium Chloride (Heparin 25,000 Units In 1/2 Ns 250 Ml)  25,000 

units in 250 mls @ 8.709 mls/hr IV TITRATE DALJIT








Labs: 


 Laboratory Tests











  11/27/19 11/27/19 11/27/19 Range/Units





  10:50 10:50 10:50 


 


WBC  14.90 H    (5.00-10.00)  10^3/uL


 


RBC  3.90 L    (4.50-6.00)  10^6/uL


 


Hgb  12.6 L    (13.0-17.0)  g/dL


 


Hct  34.9 L    (40.0-52.0)  %


 


MCV  89.5    (82.0-92.0)  fL


 


MCH  32.3 H    (27.0-31.0)  pg


 


MCHC  36.1 H    (32.0-36.0)  g/dL


 


RDW  14.4    (11.5-14.5)  %


 


Plt Count  164    (150-400)  10^3/uL


 


MPV  9.9    (7.4-10.4)  fL


 


Immature Gran % (Auto)  0.3    (0.0-5.0)  %


 


Neut % (Auto)  87.6 H    (50.0-70.0)  %


 


Lymph % (Auto)  3.5 L    (20.0-40.0)  %


 


Mono % (Auto)  8.1 H    (2.0-8.0)  %


 


Eos % (Auto)  0.3 L    (1.0-3.0)  %


 


Baso % (Auto)  0.2    (0.0-1.0)  %


 


Immature Gran # (Auto)  0.05    (0.00-0.50)  10^3/uL


 


Neut # (Auto)  13.05 H    (2.50-7.00)  10^3/uL


 


Lymph # (Auto)  0.52 L    (1.00-4.00)  10^3/uL


 


Mono # (Auto)  1.21 H    (0.10-0.80)  10^3/uL


 


Eos # (Auto)  0.04 L    (0.10-0.30)  10^3/uL


 


Baso # (Auto)  0.03    (0.00-0.10)  10^3/uL


 


PT   TNP   


 


INR   1.1   (0.9-1.1)  


 


APTT   25.3   (23.1-31.3)  SEC


 


Sodium    142  (136-145)  mmol/L


 


Potassium    4.6  (3.3-5.3)  mmol/L


 


Chloride    104  ()  mmol/L


 


Carbon Dioxide    24.7  (21.0-32.0)  mmol/L


 


Anion Gap    17.9 H  (5-15)  mmol/L


 


BUN    25  (6-25)  mg/dL


 


Creatinine    1.32 H  (0.51-1.17)  mg/dL


 


Est Cr Clr Drug Dosing    50.08  mL/min


 


Estimated GFR (MDRD)    54  mL/min


 


Glucose    294 H (75 - 99) mg/dL


 


Calcium    9.9  (8.7-10.3)  mg/dL


 


Total Bilirubin    1.9 H  (0.2-1.0)  mg/dL


 


AST    40 H  (15-37)  U/L


 


ALT    67  (12-78)  U/L


 


Alkaline Phosphatase    86  ()  IU/L


 


Troponin I    0.09 H*  (0.00-0.070)  ng/mL


 


Total Protein    7.4  (6.4-8.2)  g/dL


 


Albumin    3.90  (3.00-4.80)  g/dL











Meds: 


Medications











Generic Name Dose Route Start Last Admin





  Trade Name Freq  PRN Reason Stop Dose Admin


 


Azithromycin 500 mg/ Sodium  250 mls @ 250 mls/hr  11/27/19 11:50  





  Chloride  IV  11/27/19 12:49  





  ONETIME ONE   





     





     





     





     


 


Heparin Sodium/Sodium Chloride  25,000 units in 250 mls @ 8.709 mls/hr  11/27/ 19 12:45  





  Heparin 25,000 Units In 1/2 Ns 250 Ml  IV   





  TITRATE DALJIT   





     





     





     





  12 UNITS/KG/HR   














Discontinued Medications














Generic Name Dose Route Start Last Admin





  Trade Name Freq  PRN Reason Stop Dose Admin


 


Albuterol/Ipratropium  3 ml  11/27/19 11:23  11/27/19 11:30





  Duoneb 3.0-0.5 Mg/3 Ml  NEB  11/27/19 11:24  3 ml





  ONETIME ONE   Administration





     





     





     





     


 


Albuterol/Ipratropium  Confirm  11/27/19 11:29  





  Duoneb 3.0-0.5 Mg/3 Ml  Administered  11/27/19 11:30  





  Dose   





  3 ml   





  .ROUTE   





  .STK-MED ONE   





     





     





     





     


 


Ceftriaxone Sodium  1 gm  11/27/19 11:49  





  Rocephin  IVPUSH  11/27/19 11:50  





  ONETIME ONE   





     





     





     





     














- Radiology Interpretation


Free Text/Narrative:: 





Chest x-ray shows left lower lobe pneumonia





- Re-Assessments/Exams


Free Text/Narrative Re-Assessment/Exam: 





11/27/19 12:25


Discussed case with Dr. Bojorquez, cardiology at [facility].  He felt that the 

EKG may indicate a non-STEMI


underlining the  pneumonia and consideration for transfer.


11/27/19 12:38


Discussed case with Dr. Coley, hospitalist at [facility].  He will accept 

care for transfer.  Patient will be transferred via ground ACL[facility]





Departure





- Departure


Time of Disposition: 12:42


Disposition: DC/Tfer to [facility]


Condition: Fair


Clinical Impression: 


 Non-ST elevated myocardial infarction (non-STEMI)





Pneumonia


Qualifiers:


 Pneumonia type: due to unspecified organism Laterality: left Lung location: 

lower lobe of lung Qualified Code(s): J18.9 - Pneumonia, unspecified organism








- Discharge Information


Referrals: 


Adeline Vera MD [Primary Care Provider] - 


Forms:  ED Department Discharge





- My Orders


Last 24 Hours: 


My Active Orders





11/27/19 10:39


EKG Documentation Completion [RC] ASDIRECTED 





11/27/19 11:23


RT Aerosol Therapy [RC] ASDIRECTED 





11/27/19 11:50


Azithromycin [Zithromax] 500 mg   Sodium Chloride 0.9% [Normal Saline] 250 ml 

IV ONETIME 





11/27/19 12:17


CULTURE BLOOD [BC] Stat 


CULTURE BLOOD [BC] Stat 


LACTIC ACID [CHEM] Stat 


Blood Culture x2 Reflex Set [OM.PC] Stat 





11/27/19 12:45


Heparin Sodium/0.45% NaCl [Heparin 25,000 Units in 1/2  ML] 25,000 units 

in 250 ml IV TITRATE 














- Assessment/Plan


Last 24 Hours: 


My Active Orders





11/27/19 10:39


EKG Documentation Completion [RC] ASDIRECTED 





11/27/19 11:23


RT Aerosol Therapy [RC] ASDIRECTED 





11/27/19 11:50


Azithromycin [Zithromax] 500 mg   Sodium Chloride 0.9% [Normal Saline] 250 ml 

IV ONETIME 





11/27/19 12:17


CULTURE BLOOD [BC] Stat 


CULTURE BLOOD [BC] Stat 


LACTIC ACID [CHEM] Stat 


Blood Culture x2 Reflex Set [OM.PC] Stat 





11/27/19 12:45


Heparin Sodium/0.45% NaCl [Heparin 25,000 Units in 1/2  ML] 25,000 units 

in 250 ml IV TITRATE 











Assessment:: 





Pneumonia, EKG changes


Plan: 





Transferred to [facility]